# Patient Record
Sex: FEMALE | Race: WHITE | Employment: OTHER | URBAN - METROPOLITAN AREA
[De-identification: names, ages, dates, MRNs, and addresses within clinical notes are randomized per-mention and may not be internally consistent; named-entity substitution may affect disease eponyms.]

---

## 2017-03-10 ENCOUNTER — PATIENT OUTREACH (OUTPATIENT)
Dept: FAMILY MEDICINE CLINIC | Facility: CLINIC | Age: 72
End: 2017-03-10

## 2017-03-10 NOTE — PROGRESS NOTES
Pt stated that she was currently in [place] and would be returning to [place] in early April. She would contact the office when she returned to schedule the appointment at that time.

## 2017-05-15 ENCOUNTER — PATIENT OUTREACH (OUTPATIENT)
Dept: FAMILY MEDICINE CLINIC | Facility: CLINIC | Age: 72
End: 2017-05-15

## 2017-06-16 ENCOUNTER — PATIENT OUTREACH (OUTPATIENT)
Dept: FAMILY MEDICINE CLINIC | Facility: CLINIC | Age: 72
End: 2017-06-16

## 2017-07-07 ENCOUNTER — PATIENT OUTREACH (OUTPATIENT)
Dept: FAMILY MEDICINE CLINIC | Facility: CLINIC | Age: 72
End: 2017-07-07

## 2017-07-07 NOTE — PROGRESS NOTES
Called to schedule Medicare annual wellness visit.   Patient is on vacation and will back later in July

## 2017-08-28 ENCOUNTER — PATIENT OUTREACH (OUTPATIENT)
Dept: FAMILY MEDICINE CLINIC | Facility: CLINIC | Age: 72
End: 2017-08-28

## 2017-08-28 NOTE — PROGRESS NOTES
Called pt to see if we could now get her scheduled for MAW visit - pt advised that her  just had rotator cuff surgery and she is on nurse duty, will call back when she can to schedule.

## 2017-09-15 ENCOUNTER — PATIENT OUTREACH (OUTPATIENT)
Dept: FAMILY MEDICINE CLINIC | Facility: CLINIC | Age: 72
End: 2017-09-15

## 2017-09-15 NOTE — PROGRESS NOTES
Left message for patient that flu shot is available at office, patient may schedule a nurse visit to receive flu shots. No future appointments.

## 2017-10-18 ENCOUNTER — TELEPHONE (OUTPATIENT)
Dept: FAMILY MEDICINE CLINIC | Facility: CLINIC | Age: 72
End: 2017-10-18

## 2017-10-18 NOTE — TELEPHONE ENCOUNTER
Spoke with pt to schedule AWV. 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  Informed pt we wou

## 2017-12-01 ENCOUNTER — OFFICE VISIT (OUTPATIENT)
Dept: FAMILY MEDICINE CLINIC | Facility: CLINIC | Age: 72
End: 2017-12-01

## 2017-12-01 VITALS
WEIGHT: 163.38 LBS | RESPIRATION RATE: 18 BRPM | TEMPERATURE: 98 F | DIASTOLIC BLOOD PRESSURE: 80 MMHG | HEART RATE: 93 BPM | SYSTOLIC BLOOD PRESSURE: 122 MMHG | OXYGEN SATURATION: 98 % | BODY MASS INDEX: 27 KG/M2

## 2017-12-01 DIAGNOSIS — J01.20 ACUTE NON-RECURRENT ETHMOIDAL SINUSITIS: Primary | ICD-10-CM

## 2017-12-01 DIAGNOSIS — J40 BRONCHITIS: ICD-10-CM

## 2017-12-01 PROCEDURE — 99214 OFFICE O/P EST MOD 30 MIN: CPT | Performed by: FAMILY MEDICINE

## 2017-12-01 RX ORDER — GUAIFENESIN AND DEXTROMETHORPHAN HYDROBROMIDE 1200; 60 MG/1; MG/1
1 TABLET, EXTENDED RELEASE ORAL EVERY 12 HOURS
Qty: 1 TABLET | Refills: 0 | Status: SHIPPED | OUTPATIENT
Start: 2017-12-01 | End: 2017-12-11

## 2017-12-01 RX ORDER — AMOXICILLIN AND CLAVULANATE POTASSIUM 875; 125 MG/1; MG/1
1 TABLET, FILM COATED ORAL 2 TIMES DAILY
Qty: 20 TABLET | Refills: 0 | Status: SHIPPED | OUTPATIENT
Start: 2017-12-01 | End: 2017-12-11

## 2017-12-01 RX ORDER — METHYLPREDNISOLONE 4 MG/1
TABLET ORAL
Qty: 21 TABLET | Refills: 0 | Status: SHIPPED | OUTPATIENT
Start: 2017-12-01 | End: 2018-05-31

## 2017-12-01 NOTE — PATIENT INSTRUCTIONS
· Please increase your fluids and rest.   · PLEASE take all your antibiotic as prescribed or the infection will be ineffectively treated and may return.   · If you develop a rash, hives, itching, throat tightness, shortness-of-breath while on the antibiotic Sinuses are air-filled spaces in the skull behind the face. They are kept moist and clean by a lining of mucosa. Things such as pollen, smoke, and chemical fumes can irritate the mucosa. It can then swell up.  As a response to irritation, the mucosa makes m © 1920-3995 [employer] [address] [facility], [facility] [place] [employer]. All rights reserved. This information is not intended as a substitute for professional medical care. Always follow your healthcare professional's instructions.         Viral B · Your appetite may be poor, so a light diet is fine. Avoid dehydration by drinking 6 to 8 glasses of fluids per day (such as water, soft drinks, sports drinks, juices, tea, or soup). Extra fluids will help loosen secretions in the nose and lungs.   · Over-

## 2017-12-01 NOTE — PROGRESS NOTES
CHIEF COMPLAINT: Patient presents with:  Chest Congestion: x2 weeks  Cough        HPI:     Anthony Degroot is a 81year old female presents for evaluation of nasal congestion, copious postnasal drip, hoarse voice, burning in the throat and at times productiv Medications (Active prior to today's visit):    Current Outpatient Prescriptions:  Pseudoephedrine-Guaifenesin ([place] [address] D OR) Take by mouth. Disp:  Rfl:    Pseudoeph-Doxylamine-DM-APAP (NYQUIL OR) Take by mouth.  Disp:  Rfl:    Bharat Taylor groomed. Physical Exam  General: Well-nourished, well hydrated. No acute distress. No pallor. No tachypnea. No stridor. HEENT: normocephaly, atraumatic. Sclera clear and non icteric bilaterally.  Bilateral intact tympanic membranes without fluid and redn (MUCINEX DM MAXIMUM STRENGTH)  MG Oral Tablet 12 Hr 1 tablet 0      Sig: Take 1 tablet by mouth every 12 (twelve) hours.            Health Maintenance:  Gisela Jefferson due on 02/16/1987  FIT Colorectal Screening due on 02/16/1997  Colonoscopy,10 Year

## 2018-01-17 ENCOUNTER — PATIENT OUTREACH (OUTPATIENT)
Dept: FAMILY MEDICINE CLINIC | Facility: CLINIC | Age: 73
End: 2018-01-17

## 2018-01-17 NOTE — PROGRESS NOTES
Left patient message to call office to schedule Medicare Annual Wellness Visit. Initial done on 2/25/16.

## 2018-02-22 ENCOUNTER — TELEPHONE (OUTPATIENT)
Dept: FAMILY MEDICINE CLINIC | Facility: CLINIC | Age: 73
End: 2018-02-22

## 2018-02-22 DIAGNOSIS — Z12.39 SCREENING FOR MALIGNANT NEOPLASM OF BREAST: ICD-10-CM

## 2018-02-22 DIAGNOSIS — Z78.0 POSTMENOPAUSAL STATUS: Primary | ICD-10-CM

## 2018-02-22 NOTE — TELEPHONE ENCOUNTER
Pt is requesting new orders for mammogram and Dexa scan. Pt never completed in 2017 and orders have .     Pended for you    Future Appointments  Date Time Provider Keesha Hinkle   2018 9:00 AM Reggie Lynch DO [facility] [place]

## 2018-05-31 ENCOUNTER — HOSPITAL ENCOUNTER (EMERGENCY)
Facility: HOSPITAL | Age: 73
Discharge: HOME | End: 2018-05-31
Attending: EMERGENCY MEDICINE
Payer: MEDICARE

## 2018-05-31 ENCOUNTER — APPOINTMENT (OUTPATIENT)
Dept: CT IMAGING | Facility: HOSPITAL | Age: 73
End: 2018-05-31
Attending: EMERGENCY MEDICINE
Payer: MEDICARE

## 2018-05-31 VITALS
RESPIRATION RATE: 17 BRPM | OXYGEN SATURATION: 99 % | SYSTOLIC BLOOD PRESSURE: 152 MMHG | BODY MASS INDEX: 24.11 KG/M2 | HEART RATE: 83 BPM | TEMPERATURE: 98 F | DIASTOLIC BLOOD PRESSURE: 85 MMHG | HEIGHT: 66 IN | WEIGHT: 150 LBS

## 2018-05-31 DIAGNOSIS — G45.4 TGA (TRANSIENT GLOBAL AMNESIA): Primary | ICD-10-CM

## 2018-05-31 PROCEDURE — 82962 GLUCOSE BLOOD TEST: CPT

## 2018-05-31 PROCEDURE — 84484 ASSAY OF TROPONIN QUANT: CPT | Performed by: EMERGENCY MEDICINE

## 2018-05-31 PROCEDURE — 93005 ELECTROCARDIOGRAM TRACING: CPT

## 2018-05-31 PROCEDURE — 36415 COLL VENOUS BLD VENIPUNCTURE: CPT

## 2018-05-31 PROCEDURE — 93010 ELECTROCARDIOGRAM REPORT: CPT

## 2018-05-31 PROCEDURE — 87086 URINE CULTURE/COLONY COUNT: CPT | Performed by: EMERGENCY MEDICINE

## 2018-05-31 PROCEDURE — 99285 EMERGENCY DEPT VISIT HI MDM: CPT

## 2018-05-31 PROCEDURE — 81001 URINALYSIS AUTO W/SCOPE: CPT | Performed by: EMERGENCY MEDICINE

## 2018-05-31 PROCEDURE — 70450 CT HEAD/BRAIN W/O DYE: CPT | Performed by: EMERGENCY MEDICINE

## 2018-05-31 PROCEDURE — 84460 ALANINE AMINO (ALT) (SGPT): CPT | Performed by: EMERGENCY MEDICINE

## 2018-05-31 PROCEDURE — 84450 TRANSFERASE (AST) (SGOT): CPT | Performed by: EMERGENCY MEDICINE

## 2018-05-31 PROCEDURE — 80053 COMPREHEN METABOLIC PANEL: CPT | Performed by: EMERGENCY MEDICINE

## 2018-05-31 PROCEDURE — 80307 DRUG TEST PRSMV CHEM ANLYZR: CPT | Performed by: EMERGENCY MEDICINE

## 2018-05-31 PROCEDURE — 85025 COMPLETE CBC W/AUTO DIFF WBC: CPT | Performed by: EMERGENCY MEDICINE

## 2018-05-31 PROCEDURE — 80320 DRUG SCREEN QUANTALCOHOLS: CPT | Performed by: EMERGENCY MEDICINE

## 2018-05-31 PROCEDURE — 84132 ASSAY OF SERUM POTASSIUM: CPT | Performed by: EMERGENCY MEDICINE

## 2018-05-31 NOTE — BH LEVEL OF CARE ASSESSMENT
Level of Care Assessment Note    General Questions  Why are you here?: PT IS A PLEASANT 73 YR OLD FEMALE WHO WAS BROUGHt TO THE ER BY HER  D/T SUDDEN MEMORY LOSS. PT IS UNABLE TO RECALL WHAT HAPPENED @ HOME THIS MORNING THAT ALARMED HER .   C  states left house to play tennis at about 0930 and returned at approximately 1030. States wife was having trouble understanding an email she read. Does not recall  leaving. No slurred speech, facial droop\".    ///   \"Ambulated to and fro Factors: PT DENIES ANY CURRENT, RECENT OR HX OF SI.  FAMILY CONCURS. FAMILY ( & DAUGHTER) APPEARS VERY CONCERNED & SUPPORTIVE W/ GOOD JUDGEMENT.     Past Suicidal Ideation: Denies  Family History or Personal Lived Experience of Loss or Near Loss by amount used? : 3 - 4 NIGHTS PER NIGHT  How long with this pattern of use?: MANY YRS  Last Use?: BAL < 3 ( NONE DETECTED )  ///  LAST NIGHT:  1 - 2 GLASSES WINE  Is your current use the most/worst it has ever been? :  (PT DENIES ANY HX OF ETOH ABUSE)    Ill Volume: Ordinary  Clarity: Clear  Cognition  Concentration: Impaired  Memory: Recent memory impaired;Remote memory intact  Orientation Level: Oriented to person;Oriented to place  Insight: Good  Judgment: Good  Thought Patterns  Clarity/Relevance: Coherent OF OUTPT FAMILY COUNSELING IN THE 1980s, 1st TO ADDRESS 1st 'S TERMINAL ILLNESS ( PANCREATIC CANCER ), & THEN TO ADDRESS DAUGHTER'S EATING D/O. PT DENIES ANY HX OF TRAUMA OR ABUSE. PT DENIES ANY HX OF SI.                                    Risk/Pro

## 2018-05-31 NOTE — ED NOTES
Report received from [facility] at this time. Patient resting on cart with family at bedside. Remains updated with results at this time. Plan for [facility] evaluation to r/o grief or stress reaction.

## 2018-05-31 NOTE — ED INITIAL ASSESSMENT (HPI)
Arrives with difficulty thinking for about an hour.  states left house to play tennis at about 0930 and returned at approximately 1030. States wife was having trouble understanding an email she read. Does not recall  leaving.   No slurred s

## 2018-05-31 NOTE — ED NOTES
Report to [facility] RN at this time. Patient resting on cart with family at bedside. Crisis evaluation complete.  Waiting for dispo from MD.

## 2018-05-31 NOTE — ED NOTES
LETICIA assessment in progress at this time. Patient alert and talking to family, but does not recall events of her visit here. Patient does not recall going to CT or having blood drawn. Does not have any complaints at this time.  Continues to ask about why she

## 2018-05-31 NOTE — ED NOTES
Ambulated to and from BR without difficulty after returning from CT. Does not remember why is here. Reoriented to situation.

## 2018-05-31 NOTE — ED PROVIDER NOTES
Patient Seen in: [facility] Emergency Department    History   Patient presents with:  Altered Mental Status (neurologic)    Stated Complaint: acute memory loss x 1 hour    HPI    70-year-old female presents for evaluation of memory loss.   Patient wa kg/m²         Physical Exam    General: Alert, oriented, no apparent distress, poor memory for today's events. Intact memory for most aspects of her recent and remote life story, slightly agitated  HEENT: Atraumatic, normocephalic. Pupils equal reactive. BLUE   RAINBOW DRAW LAVENDER   RAINBOW DRAW LIGHT GREEN   RAINBOW DRAW GOLD   URINE CULTURE, ROUTINE     EKG    Rate, intervals and axes as noted on EKG Report.   Rate: 77  Rhythm: Sinus Rhythm  Reading: no ST elevation or depression           ED Course as

## 2018-05-31 NOTE — ED NOTES
MD and [facility] [place] [place] discussing patient situation and plan of care at this time.

## 2018-06-12 ENCOUNTER — OFFICE VISIT (OUTPATIENT)
Dept: FAMILY MEDICINE CLINIC | Facility: CLINIC | Age: 73
End: 2018-06-12

## 2018-06-12 VITALS
TEMPERATURE: 99 F | RESPIRATION RATE: 18 BRPM | SYSTOLIC BLOOD PRESSURE: 110 MMHG | HEART RATE: 95 BPM | DIASTOLIC BLOOD PRESSURE: 78 MMHG | BODY MASS INDEX: 25.78 KG/M2 | OXYGEN SATURATION: 98 % | HEIGHT: 66 IN | WEIGHT: 160.38 LBS

## 2018-06-12 DIAGNOSIS — Z66 DNR (DO NOT RESUSCITATE): ICD-10-CM

## 2018-06-12 DIAGNOSIS — Z00.00 ENCOUNTER FOR ANNUAL HEALTH EXAMINATION: Primary | ICD-10-CM

## 2018-06-12 DIAGNOSIS — Z23 NEED FOR VACCINATION: ICD-10-CM

## 2018-06-12 DIAGNOSIS — M85.80 OSTEOPENIA, UNSPECIFIED LOCATION: ICD-10-CM

## 2018-06-12 PROCEDURE — G0439 PPPS, SUBSEQ VISIT: HCPCS | Performed by: FAMILY MEDICINE

## 2018-06-12 PROCEDURE — 90732 PPSV23 VACC 2 YRS+ SUBQ/IM: CPT | Performed by: FAMILY MEDICINE

## 2018-06-12 PROCEDURE — G0009 ADMIN PNEUMOCOCCAL VACCINE: HCPCS | Performed by: FAMILY MEDICINE

## 2018-06-12 NOTE — PATIENT INSTRUCTIONS
Need colonoscopy  Need dexa scan  Need mammogram  Please bring in copy of living will and Power of       [address]   Tests on this list are recommended by your physician but may not be covered, or covered at this frequenc with a family history    Colorectal Cancer Screening  Covered up to Age 76     Colonoscopy Screen   Covered every 10 years- more often if abnormal Colonoscopy,10 Years due on 02/16/1997 Update Health Maintenance if applicable    Flex Sigmoidoscopy Screen (Prevnar)  Covered Once after 65 No orders found for this or any previous visit.  Please get once after your 65th birthday    Pneumococcal 23 (Pneumovax)  Covered Once after 65   Orders placed or performed in visit on 06/12/18  -PNEUMOCOCCAL IMM (PNEUMOVAX) this cancer is found and removed early, the chances of a full recovery are very good. Because colorectal cancer rarely causes symptoms in its early stages, screening for the disease is important.  It’s even more crucial if you have risk factors for the dise healthcare provider will ask about your health history, examine you, and do one or more tests. History and exam  The history and exam involve the following:  · Health history. Your healthcare provider will ask about your health history.  Mention if a famil It uses a series of X-ray photographs to create a 3-D view of the colon and rectum. The day before the test, you will need to do a bowel prep to clean out your colon. Your healthcare provider will give you instructions on how to do this.  During the procedu flexible, lighted tube called a sigmoidoscope through your rectum and lower colon. The images are displayed on a video screen. Polyps are removed, if possible, and sent to a lab for testing.   Colonoscopy is the only screening test that lets your healthcare not being seen  Getting ready   To prepare for the test:  · Talk with your healthcare provider about the risks of the test (see below).  Also ask your healthcare provider about alternatives to the test.  · Tell your healthcare provider about any medicines y This information is not intended as a substitute for professional medical care. Always follow your healthcare professional's instructions.

## 2018-06-12 NOTE — PROGRESS NOTES
HPI:   Alyssa Corona is a 72year old female who presents for a Medicare Subsequent Annual Wellness visit (Pt already had Initial Annual Wellness). Moving to [place] in 1 week in [facility]. Will establish with new PCP's.   Never had colonoscopy or details.       Depression Screening (PHQ-2/PHQ-9): Over the LAST 2 WEEKS   Little interest or pleasure in doing things (over the last two weeks)?: Not at all  Feeling down, depressed, or hopeless (over the last two weeks)?: Not at all  PHQ-2 SCORE: 0     Ad fosamax. CURRENT MEDICATIONS:     Outpatient Prescriptions Marked as Taking for the 6/12/18 encounter (Office Visit) with Masha Donald DO:  Calcium-Phosphorus-Vitamin D (CITRACAL +D3 OR) Take by mouth daily.      multiple vitamin (MVI) Oral Chew Tab C Temp 98.6 °F (37 °C) (Oral)   Resp 18   Ht 66\"   Wt 160 lb 6.4 oz   SpO2 98%   BMI 25.89 kg/m²  Estimated body mass index is 25.89 kg/m² as calculated from the following:    Height as of this encounter: 66\". Weight as of this encounter: 160 lb 6.4 oz. Influenza Vaccine, High Dose, Preserv Free 11/02/2015   • Pneumococcal (Prevnar 13) 02/26/2016   • Zoster Vaccine Live (Zostavax) 09/04/2013   Pended Date(s) Pended   • Pneumovax 23 06/12/2018        ASSESSMENT AND OTHER RELEVANT CHRONIC CONDITIONS:   Henrique Dawkins well-being?: Social Interaction;Puzzles;Games; Visiting Friends; Visiting Family      This section provided for quick review of chart, separate sheet to patient  [address] Internal Lab or Procedure External Lab or Proced once after your 65th birthday    Pneumococcal 23 (Pneumovax)  Covered Once after 65 No vaccine history found Please get once after your 65th birthday    Hepatitis B for Moderate/High Risk No vaccine history found Medium/high risk factors:   End-stage renal

## 2023-03-09 NOTE — PROGRESS NOTES
Pt asked us to call her back on Tuesday, 5/16/17. [Use of Plain Language] : use of plain language [Needs Reinforcement, Provided] : needs reinforcement, provided

## (undated) NOTE — ED AVS SNAPSHOT
Hector Nelsonben   MRN: [id number]    Department:  [facility] Emergency Department   Date of Visit:  5/31/2018           Disclosure     Insurance plans vary and the physician(s) referred by the ER may not be covered by your plan.  Please contact your tell this physician (or your personal doctor if your instructions are to return to your personal doctor) about any new or lasting problems. The primary care or specialist physician will see patients referred from the [facility] Emergency Department.  Romero Tay